# Patient Record
Sex: FEMALE | Race: WHITE | URBAN - METROPOLITAN AREA
[De-identification: names, ages, dates, MRNs, and addresses within clinical notes are randomized per-mention and may not be internally consistent; named-entity substitution may affect disease eponyms.]

---

## 2021-11-12 ENCOUNTER — OFFICE VISIT (OUTPATIENT)
Dept: SURGERY | Facility: CLINIC | Age: 51
End: 2021-11-12
Payer: COMMERCIAL

## 2021-11-12 ENCOUNTER — TELEPHONE (OUTPATIENT)
Dept: SURGERY | Facility: CLINIC | Age: 51
End: 2021-11-12

## 2021-11-12 VITALS — HEIGHT: 64 IN | WEIGHT: 130 LBS | BODY MASS INDEX: 22.2 KG/M2

## 2021-11-12 DIAGNOSIS — K64.8 HEMORRHOIDS, COMPLICATED: Primary | ICD-10-CM

## 2021-11-12 PROCEDURE — 3008F BODY MASS INDEX DOCD: CPT | Performed by: SURGERY

## 2021-11-12 PROCEDURE — 99244 OFF/OP CNSLTJ NEW/EST MOD 40: CPT | Performed by: SURGERY

## 2021-11-12 NOTE — TELEPHONE ENCOUNTER
Per pt has an appointment 12/22 and asking if there is a later time that day. Per pt she will take the latest available.  Please advise

## 2021-11-12 NOTE — H&P
Chief complaint: Patient presents with:  Hemorrhoids: Referred by Dr. Luis De Luna for hemorrhoids. HPI: Doretha Irving has long standing hemorrhoids, recently her external hemorrhoid swelled, became painful and  Thrombosed.  She has a h.o. constipation, uses fiber berkowitz reg rate  Abdomen: soft,nodistended  Rectal: normal tone, heme negative stool, large external thrombosed hemorrhoid, smaller external hemorrhoids as well. No necrosis, bleeding. Extremities: no edema, cyanosis, or clubbing. No deformity.   Musculoskeletal: